# Patient Record
Sex: MALE | Race: WHITE | NOT HISPANIC OR LATINO | URBAN - METROPOLITAN AREA
[De-identification: names, ages, dates, MRNs, and addresses within clinical notes are randomized per-mention and may not be internally consistent; named-entity substitution may affect disease eponyms.]

---

## 2023-11-20 ENCOUNTER — ON-DEMAND VIRTUAL (OUTPATIENT)
Dept: URGENT CARE | Facility: CLINIC | Age: 43
End: 2023-11-20
Payer: OTHER GOVERNMENT

## 2023-11-20 DIAGNOSIS — B00.1 COLD SORE: Primary | ICD-10-CM

## 2023-11-20 PROCEDURE — 99202 OFFICE O/P NEW SF 15 MIN: CPT | Mod: 95,,, | Performed by: NURSE PRACTITIONER

## 2023-11-20 PROCEDURE — 99202 PR OFFICE/OUTPT VISIT, NEW, LEVL II, 15-29 MIN: ICD-10-PCS | Mod: 95,,, | Performed by: NURSE PRACTITIONER

## 2023-11-20 RX ORDER — VALACYCLOVIR HYDROCHLORIDE 1 G/1
1000 TABLET, FILM COATED ORAL DAILY
Qty: 30 TABLET | Refills: 0 | Status: SHIPPED | OUTPATIENT
Start: 2023-11-20

## 2023-11-20 NOTE — PROGRESS NOTES
Subjective:      Patient ID: Sudhir Ovalles is a 43 y.o. male.    Vitals:  vitals were not taken for this visit.     Chief Complaint: Blister      Visit Type: TELE AUDIOVISUAL    Present with the patient at the time of consultation: TELEMED PRESENT WITH PATIENT: None    History reviewed. No pertinent past medical history.  History reviewed. No pertinent surgical history.  Review of patient's allergies indicates:  Not on File  No current outpatient medications on file prior to visit.     No current facility-administered medications on file prior to visit.     History reviewed. No pertinent family history.    Medications Ordered                [id number] - [place] - [address] [facility]   [address] [address]    Telephone: [phone]   Fax: [phone]   Hours: Not open 24 hours                         E-Prescribed (1 of 1)              valACYclovir (VALTREX) 1000 MG tablet    Sig: Take 1 tablet (1,000 mg total) by mouth once daily.       Start: 11/20/23     Quantity: 30 tablet Refills: 0                           [facility] Intake    11/20/2023 12:20 PM CST - Filed by Patient   Describe your reason for todays visit I have a cold sore. I would like a prescription for valacyclovir   What is your current physical address in the event of a medical emergency? [address] [place] [address]   Are you able to take your vital signs? No   Please attach any relevant images or files          Patient states visiting from [place] C/o  cold sore to bottom lip and needs valtrex refill         Skin:  Positive for lesion.        Objective:   The physical exam was conducted virtually.  Physical Exam   Constitutional: He does not appear ill. No distress.   Neurological: He is alert.   Skin:         Comments: Vesicular lesion to bottom lip       Assessment:     1. Cold sore        Plan:       Cold sore  -     valACYclovir (VALTREX) 1000 MG tablet; Take 1  tablet (1,000 mg total) by mouth once daily.  Dispense: 30 tablet; Refill: 0                  Patient Instructions   - You must understand that you have received an Urgent Care treatment only and that you may be released before all of your medical problems are known or treated.   - You, the patient, will arrange for follow up care as instructed.   - If your condition worsens or fails to improve we recommend that you receive another evaluation at the ER immediately or contact your PCP to discuss your concerns or return here.     Advised on return/follow-up precautions. Advised on ER precautions. Answered all patient questions. Patient verbalized understanding and voiced agreement with current treatment plan.